# Patient Record
Sex: FEMALE | Race: WHITE | Employment: OTHER | URBAN - METROPOLITAN AREA
[De-identification: names, ages, dates, MRNs, and addresses within clinical notes are randomized per-mention and may not be internally consistent; named-entity substitution may affect disease eponyms.]

---

## 2017-01-16 ENCOUNTER — HOSPITAL ENCOUNTER (OUTPATIENT)
Dept: MAMMOGRAPHY | Facility: HOSPITAL | Age: 72
Discharge: HOME | End: 2017-01-16
Attending: PHYSICIAN ASSISTANT
Payer: MEDICARE

## 2017-01-16 DIAGNOSIS — Z12.31 ENCOUNTER FOR SCREENING MAMMOGRAM FOR MALIGNANT NEOPLASM OF BREAST: ICD-10-CM

## 2017-01-16 DIAGNOSIS — R93.89 ABNORMAL FINDINGS ON IMAGING TEST: ICD-10-CM

## 2017-01-16 PROCEDURE — 77066 DX MAMMO INCL CAD BI: CPT

## 2017-01-16 PROCEDURE — 76642 ULTRASOUND BREAST LIMITED: CPT

## 2017-01-16 PROCEDURE — 77062 BREAST TOMOSYNTHESIS BI: CPT

## 2018-08-28 ENCOUNTER — APPOINTMENT (OUTPATIENT)
Dept: OTHER | Facility: HOSPITAL | Age: 73
End: 2018-08-28
Attending: PREVENTIVE MEDICINE

## 2020-02-11 ENCOUNTER — HOSPITAL ENCOUNTER (OUTPATIENT)
Dept: CV DIAGNOSTICS | Facility: HOSPITAL | Age: 75
Discharge: HOME | End: 2020-02-11
Attending: PHYSICIAN ASSISTANT
Payer: MEDICARE

## 2020-02-11 ENCOUNTER — HOSPITAL ENCOUNTER (OUTPATIENT)
Dept: ULTRASOUND IMAGING | Facility: HOSPITAL | Age: 75
Discharge: HOME | End: 2020-02-11
Attending: PHYSICIAN ASSISTANT
Payer: MEDICARE

## 2020-02-11 DIAGNOSIS — I65.23 CAROTID STENOSIS, BILATERAL: ICD-10-CM

## 2020-02-11 DIAGNOSIS — R06.00 DYSPNEA ON EXERTION: ICD-10-CM

## 2020-02-11 DIAGNOSIS — E78.5 HYPERLIPIDEMIA, UNSPECIFIED HYPERLIPIDEMIA TYPE: ICD-10-CM

## 2020-02-11 DIAGNOSIS — I70.91 GENERALIZED ATHEROSCLEROSIS: ICD-10-CM

## 2020-02-11 PROCEDURE — 93880 EXTRACRANIAL BILAT STUDY: CPT | Performed by: PHYSICIAN ASSISTANT

## 2020-02-11 PROCEDURE — 93306 TTE W/DOPPLER COMPLETE: CPT | Performed by: PHYSICIAN ASSISTANT

## 2020-03-12 ENCOUNTER — HOSPITAL ENCOUNTER (OUTPATIENT)
Dept: CV DIAGNOSTICS | Facility: HOSPITAL | Age: 75
Discharge: HOME | End: 2020-03-12
Attending: PHYSICIAN ASSISTANT
Payer: MEDICARE

## 2020-03-12 DIAGNOSIS — I65.23 ATHEROSCLEROSIS OF BOTH CAROTID ARTERIES: ICD-10-CM

## 2020-03-12 DIAGNOSIS — I65.23 BILATERAL CAROTID ARTERY STENOSIS: ICD-10-CM

## 2020-03-12 DIAGNOSIS — E78.5 HYPERLIPEMIA: ICD-10-CM

## 2020-03-12 DIAGNOSIS — R06.00 DYSPNEA ON EXERTION: ICD-10-CM

## 2020-03-12 DIAGNOSIS — I70.91 ATHEROSCLEROSIS, GENERALIZED: ICD-10-CM

## 2020-03-12 PROCEDURE — 93350 STRESS TTE ONLY: CPT | Performed by: PHYSICIAN ASSISTANT

## 2020-03-12 PROCEDURE — 93017 CV STRESS TEST TRACING ONLY: CPT | Performed by: PHYSICIAN ASSISTANT

## 2020-03-12 PROCEDURE — 93018 CV STRESS TEST I&R ONLY: CPT | Performed by: PHYSICIAN ASSISTANT

## 2022-08-15 ENCOUNTER — OFFICE VISIT (OUTPATIENT)
Dept: HEMATOLOGY/ONCOLOGY | Facility: HOSPITAL | Age: 77
End: 2022-08-15
Attending: INTERNAL MEDICINE
Payer: MEDICARE

## 2022-08-15 VITALS
TEMPERATURE: 98 F | RESPIRATION RATE: 18 BRPM | HEART RATE: 2 BPM | OXYGEN SATURATION: 98 % | DIASTOLIC BLOOD PRESSURE: 71 MMHG | SYSTOLIC BLOOD PRESSURE: 128 MMHG | BODY MASS INDEX: 20 KG/M2 | WEIGHT: 110.63 LBS

## 2022-08-15 DIAGNOSIS — D80.1 HYPOGAMMAGLOBULINEMIA (HCC): Primary | ICD-10-CM

## 2022-08-15 LAB
BASOPHILS # BLD AUTO: 0.05 X10(3) UL (ref 0–0.2)
BASOPHILS NFR BLD AUTO: 1 %
EOSINOPHIL # BLD AUTO: 0.08 X10(3) UL (ref 0–0.7)
EOSINOPHIL NFR BLD AUTO: 1.5 %
ERYTHROCYTE [DISTWIDTH] IN BLOOD BY AUTOMATED COUNT: 13.4 %
HCT VFR BLD AUTO: 42 %
HGB BLD-MCNC: 14 G/DL
IGA SERPL-MCNC: 71.6 MG/DL (ref 70–312)
IGM SERPL-MCNC: 62.5 MG/DL (ref 43–279)
IMM GRANULOCYTES # BLD AUTO: 0.01 X10(3) UL (ref 0–1)
IMM GRANULOCYTES NFR BLD: 0.2 %
IMMUNOGLOBULIN PNL SER-MCNC: 582 MG/DL (ref 791–1643)
LDH SERPL L TO P-CCNC: 195 U/L
LYMPHOCYTES # BLD AUTO: 1.43 X10(3) UL (ref 1–4)
LYMPHOCYTES NFR BLD AUTO: 27.6 %
MCH RBC QN AUTO: 31 PG (ref 26–34)
MCHC RBC AUTO-ENTMCNC: 33.3 G/DL (ref 31–37)
MCV RBC AUTO: 92.9 FL
MONOCYTES # BLD AUTO: 0.33 X10(3) UL (ref 0.1–1)
MONOCYTES NFR BLD AUTO: 6.4 %
NEUTROPHILS # BLD AUTO: 3.28 X10 (3) UL (ref 1.5–7.7)
NEUTROPHILS # BLD AUTO: 3.28 X10(3) UL (ref 1.5–7.7)
NEUTROPHILS NFR BLD AUTO: 63.3 %
PLATELET # BLD AUTO: 192 10(3)UL (ref 150–450)
RBC # BLD AUTO: 4.52 X10(6)UL
WBC # BLD AUTO: 5.2 X10(3) UL (ref 4–11)

## 2022-08-15 PROCEDURE — 85025 COMPLETE CBC W/AUTO DIFF WBC: CPT | Performed by: INTERNAL MEDICINE

## 2022-08-15 PROCEDURE — 82784 ASSAY IGA/IGD/IGG/IGM EACH: CPT | Performed by: INTERNAL MEDICINE

## 2022-08-15 PROCEDURE — 36415 COLL VENOUS BLD VENIPUNCTURE: CPT

## 2022-08-15 PROCEDURE — 83615 LACTATE (LD) (LDH) ENZYME: CPT | Performed by: INTERNAL MEDICINE

## 2022-08-15 NOTE — PROGRESS NOTES
Patient is here for consultation for low protein levels. She was recommended by Dr. Franki Blanton office to come here. She reports that in the 1990's she had an episode of fever and chills after a skiing trip. She had a bone marrow aspiration for possible leukemia. 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 She had chemo and this ultimately resolved. She generally feels well. She says that she always feels tired and can fall asleep easily. She plays [employer] ball three times a week and also goes to the health club. She is eating well. She denies any fever or night sweats, no cough or shortness of breath.    She reports that she has osteopenia and takes vitamin D.     Education Record    Learner:  Patient    Disease / Diagnosis: low proteins    Barriers / Limitations:  None   Comments:    Method:  Discussion   Comments:    General Topics:  Side effects and symptom management   Comments:    Outcome:  Shows understanding   Comments:

## 2022-08-24 ENCOUNTER — TELEPHONE (OUTPATIENT)
Dept: HEMATOLOGY/ONCOLOGY | Facility: HOSPITAL | Age: 77
End: 2022-08-24

## 2023-03-15 PROBLEM — Z80.0 FAMILY HISTORY OF COLON CANCER: Status: ACTIVE | Noted: 2023-03-15

## 2023-03-15 PROBLEM — K63.5 COLON POLYP: Status: ACTIVE | Noted: 2023-03-15

## 2023-03-15 PROBLEM — Z86.0101 HISTORY OF ADENOMATOUS POLYP OF COLON: Status: ACTIVE | Noted: 2023-03-15

## 2025-01-02 ENCOUNTER — HOSPITAL ENCOUNTER (EMERGENCY)
Facility: HOSPITAL | Age: 80
Discharge: HOME | End: 2025-01-02
Attending: EMERGENCY MEDICINE
Payer: MEDICARE

## 2025-01-02 ENCOUNTER — APPOINTMENT (OUTPATIENT)
Dept: GENERAL RADIOLOGY | Facility: HOSPITAL | Age: 80
End: 2025-01-02
Attending: EMERGENCY MEDICINE
Payer: MEDICARE

## 2025-01-02 VITALS
TEMPERATURE: 97 F | HEART RATE: 66 BPM | HEIGHT: 63 IN | BODY MASS INDEX: 19.49 KG/M2 | DIASTOLIC BLOOD PRESSURE: 73 MMHG | RESPIRATION RATE: 16 BRPM | WEIGHT: 110 LBS | OXYGEN SATURATION: 100 % | SYSTOLIC BLOOD PRESSURE: 128 MMHG

## 2025-01-02 DIAGNOSIS — M54.14 THORACIC RADICULOPATHY: Primary | ICD-10-CM

## 2025-01-02 PROCEDURE — 99283 EMERGENCY DEPT VISIT LOW MDM: CPT

## 2025-01-02 PROCEDURE — 72072 X-RAY EXAM THORAC SPINE 3VWS: CPT | Performed by: EMERGENCY MEDICINE

## 2025-01-02 RX ORDER — METHYLPREDNISOLONE 4 MG/1
TABLET ORAL
Qty: 1 EACH | Refills: 0 | Status: SHIPPED | OUTPATIENT
Start: 2025-01-02

## 2025-01-02 RX ORDER — TRAMADOL HYDROCHLORIDE 50 MG/1
TABLET ORAL EVERY 6 HOURS PRN
Qty: 10 TABLET | Refills: 0 | Status: SHIPPED | OUTPATIENT
Start: 2025-01-02 | End: 2025-01-07

## 2025-01-02 RX ORDER — MULTIVITAMIN WITH IRON
50 TABLET ORAL DAILY
COMMUNITY

## 2025-01-02 RX ORDER — ORPHENADRINE CITRATE 100 MG/1
100 TABLET ORAL 2 TIMES DAILY
Qty: 14 EACH | Refills: 0 | Status: SHIPPED | OUTPATIENT
Start: 2025-01-02 | End: 2025-01-09

## 2025-01-02 NOTE — ED INITIAL ASSESSMENT (HPI)
Patient reports she has had shoulder blade pain on the left side for the last two nights. States she is unable to get comfortable. States when she supinates her hand, she feels tingling from her shoulder down to her fingertips. Full ROM in left arm. Was instructed by PCP to go to urgent care who did not see her but sent her to ED. Took advil at 0530, states she got relief from advil. Reports complete resolution of pain on arrival to ED but had burning pain that interfered with sleep last night.

## 2025-01-02 NOTE — ED PROVIDER NOTES
Patient Seen in: [facility] Emergency Department      History     Chief Complaint   Patient presents with    Shoulder Pain     Stated Complaint: L shoulder blade \"burning\" pain x2 days    Subjective:   HPI      79-year-old female presents for evaluation of a burning pain to her left shoulder blade area which radiates down the inside of her upper arm and forearm.  Pain seems to be worse with certain movements of the torso.  She has no weakness or tingling to the left arm.  No chest pain or shortness of breath.  There is no pleuritic component.  No cough or cold.  No chest wall rashes.  She does play pickle ball but uses mostly her right arm    Objective:     Past Medical History:    High cholesterol    History of blood transfusion    Leaking of urine    Osteoporosis    Wears glasses              Past Surgical History:   Procedure Laterality Date    Colonoscopy      Hysterectomy  1980's    Graham localization wire 1 site left (cpt=19281)  1991    benign    Graham localization wire 1 site right (cpt=19281)  1976    benign    Other surgical history      knee surgery    Tonsillectomy                  Social History     Socioeconomic History    Marital status:    Tobacco Use    Smoking status: Never    Smokeless tobacco: Never   Substance and Sexual Activity    Alcohol use: Yes     Alcohol/week: 4.0 standard drinks of alcohol     Types: 4 Glasses of wine per week     Comment: 2-3 times per week, 2 glasses of wine per day    Drug use: No     Social Drivers of Health      Received from [facility]    Social Connections    Received from [facility], [facility]    Housing Stability                  Physical Exam     ED Triage Vitals [01/02/25 1154]   /73   Pulse 66   Resp 16   Temp 97.3 °F (36.3 °C)   Temp src Temporal   SpO2 99 %   O2 Device None (Room air)       Current Vitals:   Vital Signs  BP: 128/73  Pulse: 66  Resp: 16  Temp: 97.3 °F (36.3  °C)  Temp src: Temporal    Oxygen Therapy  SpO2: 99 %  O2 Device: None (Room air)        Physical Exam     General: Alert, oriented, no apparent distress  HEENT:  Pupils equal reactive.  Extraocular motions intact. MMM.  Neck: Supple  Lungs: Clear to auscultation bilaterally.  Heart: Regular rate and rhythm.  Abdomen: Soft, nontender.   Skin: No rash.  No edema.  Neurologic: No focal neurologic deficits.  Normal speech pattern  Musculoskeletal: Tenderness to palpation along the left rhomboid musculature.  Full range of motion throughout.      ED Course   Labs Reviewed - No data to display                MDM      Differential diagnosis includes radiculopathy, shingles, pleurisy    XR THORACIC SPINE (3 VIEWS) (CPT=72072)    Result Date: 1/2/2025  PROCEDURE:  XR THORACIC SPINE (3 VIEWS) (CPT=72072)  TECHNIQUE:  AP, lateral, and swimmer's views of the thoracic spine were obtained.  COMPARISON:  None.  INDICATIONS:  L shoulder blade burning pain x2 days  PATIENT STATED HISTORY: (As transcribed by Technologist)    L shoulder blade burning pain x2 days.    FINDINGS:    BONES:  Minimal dextroscoliotic deformity is noted of the lower thoracic spine. DISC SPACES:  Mild disc space narrowing and endplate osteophyte formation is noted at all levels. PARASPINOUS:  Negative.  No paraspinous abnormality is seen. OTHER:  Negative.             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  There is no acute abnormality.   LOCATION:  [facility]    Dictated by (CST): Adriel Crockett MD on 1/02/2025 at 1:28 PM     Finalized by (CST): Adriel Crockett MD on 1/02/2025 at 1:29 PM        Suspect a upper thoracic radiculopathy.  Plan for Medrol and Norflex.  Ultram as needed for breakthrough pain.  Follow-up with pain/spine if not better in about 1 week.  Return here if symptoms worsen or if new symptoms develop    Medical Decision Making      Disposition and Plan     Clinical Impression:  1. Thoracic radiculopathy          Disposition:  Discharge  1/2/2025  1:59 pm    Follow-up:  [facility], [address] [place] [address] [place] [id number] [phone]  Call  choose option 2 for neurosurgery or pain follow up          Medications Prescribed:  Current Discharge Medication List        START taking these medications    Details   methylPREDNISolone (MEDROL) 4 MG Oral Tablet Therapy Pack Dosepack: take as directed  Qty: 1 each, Refills: 0      orphenadrine  MG Oral Tablet 12 Hr Take 100 mg by mouth 2 (two) times daily for 7 days.  Qty: 14 each, Refills: 0      traMADol 50 MG Oral Tab Take 1-2 tablets ( mg total) by mouth every 6 (six) hours as needed for Pain.  Qty: 10 tablet, Refills: 0    Associated Diagnoses: Thoracic radiculopathy                 Supplementary Documentation:

## 2025-01-02 NOTE — DISCHARGE INSTRUCTIONS
Neurologic Instructions    Call your doctor if you have:  increased pain or headache.   trouble seeing, walking or using your arms or legs.   dizziness or passing out.   any change in behavior (agitated or sleepy).   trouble being awakened from sleep.   numbness in your face, arm or leg.   extreme weakness.   trouble talking.   nausea and vomiting.   any new or severe symptoms.    Take your medicines as prescribed. Most important, see a doctor again as discussed. If you have problems that we have not discussed, call or visit your doctor right away. If you cannot reach your doctor, return to the Emergency Department.     START MEDROL FOR ANTI-INFLAMMATORY - DO NOT TAKE MOTRIN OR IBUPROFEN WITH THIS MEDICATION    TRY NORFLEX FOR MUSCLE RELAXANT - MAY CAUSE DIZZINESS SO RECOMMEND TRY AT NIGHT    TYLENOL 1 GRAM THREE TIMES DAILY FOR PAIN.  TRAMADOL IF NEEDED FOR BREAKTHROUGH PAIN

## 2025-01-31 ENCOUNTER — TELEPHONE (OUTPATIENT)
Facility: CLINIC | Age: 80
End: 2025-01-31

## 2025-01-31 DIAGNOSIS — M54.2 NECK PAIN: Primary | ICD-10-CM

## 2025-01-31 NOTE — TELEPHONE ENCOUNTER
X-Ray ordered and scheduled. Patient called and notified. She was instructed to drop off her MRI cd to our office before she goes over to imaging.      - Previous images were of the thoracic spine and pain is in the shoulder/neck area.  Weight bearing images ordered.     Future Appointments   Date Time Provider Department Center   2/3/2025  8:10 AM [facility] [place]   2/3/2025  8:40 AM Rigo Cornell MD [facility]     Patient has questions about if we take her insurance.  She states she will call her insurance to find out about her coverage.

## 2025-01-31 NOTE — TELEPHONE ENCOUNTER
Patient called to schedule for  There is degenerative disc disease in the thoracic spine.  Thoracic pain that goes down the arms.   Please advise if further imaging is needed.   Future Appointments   Date Time Provider Department Center   2/3/2025  8:40 AM Rigo Cornell MD [facility]     Patient will bring MRI disc from [facility].

## 2025-02-03 ENCOUNTER — TELEPHONE (OUTPATIENT)
Facility: CLINIC | Age: 80
End: 2025-02-03

## 2025-02-03 ENCOUNTER — HOSPITAL ENCOUNTER (OUTPATIENT)
Dept: GENERAL RADIOLOGY | Age: 80
Discharge: HOME | End: 2025-02-03
Attending: STUDENT IN AN ORGANIZED HEALTH CARE EDUCATION/TRAINING PROGRAM
Payer: MEDICARE

## 2025-02-03 ENCOUNTER — OFFICE VISIT (OUTPATIENT)
Dept: ORTHOPEDICS CLINIC | Facility: CLINIC | Age: 80
End: 2025-02-03
Payer: COMMERCIAL

## 2025-02-03 DIAGNOSIS — M54.2 NECK PAIN: ICD-10-CM

## 2025-02-03 DIAGNOSIS — M54.12 CERVICAL RADICULOPATHY: Primary | ICD-10-CM

## 2025-02-03 PROCEDURE — 99204 OFFICE O/P NEW MOD 45 MIN: CPT | Performed by: STUDENT IN AN ORGANIZED HEALTH CARE EDUCATION/TRAINING PROGRAM

## 2025-02-03 PROCEDURE — 1159F MED LIST DOCD IN RCRD: CPT | Performed by: STUDENT IN AN ORGANIZED HEALTH CARE EDUCATION/TRAINING PROGRAM

## 2025-02-03 PROCEDURE — 72050 X-RAY EXAM NECK SPINE 4/5VWS: CPT | Performed by: STUDENT IN AN ORGANIZED HEALTH CARE EDUCATION/TRAINING PROGRAM

## 2025-02-03 PROCEDURE — G2211 COMPLEX E/M VISIT ADD ON: HCPCS | Performed by: STUDENT IN AN ORGANIZED HEALTH CARE EDUCATION/TRAINING PROGRAM

## 2025-02-03 PROCEDURE — 1160F RVW MEDS BY RX/DR IN RCRD: CPT | Performed by: STUDENT IN AN ORGANIZED HEALTH CARE EDUCATION/TRAINING PROGRAM

## 2025-02-03 RX ORDER — MELOXICAM 7.5 MG/1
7.5 TABLET ORAL DAILY
Qty: 30 TABLET | Refills: 0 | Status: SHIPPED | OUTPATIENT
Start: 2025-02-03

## 2025-02-03 NOTE — TELEPHONE ENCOUNTER
Pt has questions about her diagnosis for her back and the physical therapy locations. Please advise the pt [phone]  No future appointments.

## 2025-02-03 NOTE — H&P
[facility] - ORTHOPEDICS  [address], [address] [address]  [address] [place] [address]  [phone]     NEW PATIENT VISIT - HISTORY AND PHYSICAL EXAMINATION     Name: Mackenzie Kennedy   MRN: [id number]  Date: 02/03/25       CC: neck and arm pain    REFERRED BY: Faustino Narvaez MD    HPI:   Mackenzie Kennedy is a very pleasant 80 year old female who presents today for evaluation of neck and arm pain. The distribution of symptoms are: 10% neck pain and 90% arm pain. The symptoms began 4 week(s) ago without any significant injury. Since the onset, the symptoms have remained the same. The patient reports  numbness and no weakness.  The symptom characteristics are as follows: 80-year-old female presenting with neck pain radiating down left upper extremity associated with numbness. Pain is dominantly around the interscapular..     Prior spine surgery: none.    Bowel and bladder symptoms: absent.    The patient has not had issues with balance and/or hand dexterity problems such as changes in penmanship or the use of buttons or zippers.    Treatment up to this time has included:    Evaluation: PCP and ED  NSAIDS: have not been prescribed  Narcotic use: None  Physical therapy: None  Spinal injections: None      PMH:   Past Medical History:    High cholesterol    History of blood transfusion    Leaking of urine    Osteoporosis    Wears glasses       PAST SURGICAL HX:  Past Surgical History:   Procedure Laterality Date    Colonoscopy      Hysterectomy  1980's    Graham localization wire 1 site left (cpt=19281)  1991    benign    Graham localization wire 1 site right (cpt=19281)  1976    benign    Other surgical history      knee surgery    Tonsillectomy         FAMILY HX:  Family History   Problem Relation Age of Onset    Heart Attack Father     Colon Cancer Mother     Breast Cancer Paternal Grandmother 60       ALLERGIES:  Cortizone-5 [hydrocortisone]    MEDICATIONS:   Current  Outpatient Medications   Medication Sig Dispense Refill    Meloxicam 7.5 MG Oral Tab Take 1 tablet (7.5 mg total) by mouth daily. 30 tablet 0    vitamin B-12 50 MCG Oral Tab Take 1 tablet (50 mcg total) by mouth daily.      methylPREDNISolone (MEDROL) 4 MG Oral Tablet Therapy Pack Dosepack: take as directed 1 each 0    PEG 3350-KCl-Na Bicarb-NaCl 420 g Oral Recon Soln Take as directed by physician 4000 mL 0    Rosuvastatin Calcium 5 MG Oral Tab TK 1 T PO QD  0    Omega-3 Fatty Acids (FISH OIL) 1200 MG Oral Cap Take by mouth.      vitamin E 400 UNITS Oral Cap Take 1,000 Units by mouth daily.      Vitamin D3 2000 units Oral Cap Take 1 capsule (2,000 Units total) by mouth daily.      Red Yeast Rice 600 MG Oral Cap Take by mouth.         ROS: A comprehensive 14 point review of systems was performed and was negative aside from the aforementioned per history of present illness.    SOCIAL HX:  Social History     Tobacco Use    Smoking status: Never    Smokeless tobacco: Never   Substance Use Topics    Alcohol use: Yes     Alcohol/week: 4.0 standard drinks of alcohol     Types: 4 Glasses of wine per week     Comment: 2-3 times per week, 2 glasses of wine per day         PE:   There were no vitals filed for this visit.  Estimated body mass index is 19.49 kg/m² as calculated from the following:    Height as of 1/2/25: 5' 3\" (1.6 m).    Weight as of 1/2/25: 110 lb (49.9 kg).    Physical Exam  Constitutional:       Appearance: Normal appearance.   HENT:      Head: Normocephalic and atraumatic.   Eyes:      Extraocular Movements: Extraocular movements intact.   Cardiovascular:      Pulses: Normal pulses. Skin warm and well perfused.  Pulmonary:      Effort: Pulmonary effort is normal. No respiratory distress.   Skin:     General: Skin is warm.   Psychiatric:         Mood and Affect: Mood normal.     Spine Exam:    Normal gait without difficulty  Able to heel, toe, tandem gait without difficulty  Level shoulders and hips in even  stance    Normal C-spine ROM    No tenderness to palpation of C-spine    Spluring: negative    Fletcher's: negative  IRR: negative  Sustained clonus: negative     and release: normal  Rhomberg: normal    UE Strength: 5/5 D Bi Tri WE FDS IO  UE Sensation: normal in C5-T1 distribution  UE reflexes: normal    Radiographic Examination/Diagnostics:  XR and MRI personally viewed, independently interpreted and radiology report was reviewed.  X-ray of the cervical spine demonstrates diffuse spondylotic changes  MRI of the thoracic spine demonstrates varying degrees of degenerative changes and stenosis    IMPRESSION: Mackenzie Kennedy is a 80 year old female with left upper extremity pain, particularly around the inner scapula, likely cervical radiculopathy    PLAN:     We reviewed the patients history, symptoms, exam findings, and imaging today.  We had a detailed discussion outlining the etiology, anatomy, pathophysiology, and natural history of cervical radiculopathy. The typical management of this condition may include lifestyle modification, NSAIDs, physical therapy, oral steroids, epidural injections, neuromodulatory medications, and sometimes pain medications.  Based on our discussion today we would like to have the patient initiate our recommendations for continued conservative therapy in the treatment of their condition noted in the assessment section.    - Referred patient to PT  - Prescribed Meloxicam    FOLLOW-UP:  We will see her back in follow-up in 6 weeks, or sooner if any problems arise. Patient understands and agrees with plan.       Rigo Cornell MD  Orthopedic Spine Surgeon  [facility]   [address] [place] [address] [id number] [place] [address] [id number] [place] [address].org  [email or web address]  t: [phone]   f: [phone]        This note was dictated using Dragon software.  While it was briefly proofread prior to completion, some grammatical, spelling, and  word choice errors due to dictation may still occur.

## 2025-02-04 ENCOUNTER — TELEPHONE (OUTPATIENT)
Dept: PHYSICAL THERAPY | Facility: HOSPITAL | Age: 80
End: 2025-02-04

## 2025-02-04 NOTE — TELEPHONE ENCOUNTER
Pt returned call and said her questions has been answered. She no longer needs to speak with anyone from the clinical staff.

## 2025-02-05 ENCOUNTER — OFFICE VISIT (OUTPATIENT)
Dept: PHYSICAL THERAPY | Facility: HOSPITAL | Age: 80
End: 2025-02-05
Attending: STUDENT IN AN ORGANIZED HEALTH CARE EDUCATION/TRAINING PROGRAM
Payer: MEDICARE

## 2025-02-05 DIAGNOSIS — M54.12 CERVICAL RADICULOPATHY: Primary | ICD-10-CM

## 2025-02-05 PROCEDURE — 97112 NEUROMUSCULAR REEDUCATION: CPT

## 2025-02-05 PROCEDURE — 97161 PT EVAL LOW COMPLEX 20 MIN: CPT

## 2025-02-05 NOTE — PROGRESS NOTES
SPINE EVALUATION:     Diagnosis:   Cervical radiculopathy (M54.12 Patient:  Mackenzie Kennedy (80 year old, female)        Referring Provider: Rigo Cornell  Today's Date   2/5/2025    Precautions:      Date of Evaluation: 02/05/25  Next MD visit: No data recorded  Date of Surgery: none     PATIENT SUMMARY   Summary of chief complaints: pain and tingling down her left arm and into her shoulder blade. Inability to play pickleball and lift weights as she usually does. Her sleep has been disturbed. She is able to cook but with tingling in the left arm.  History of current condition: tingling down the left arm to the fingers with itchy sensation that started at the New Year.  She was given a muscle relaxant and pain killer in the ER. She had an MRI 1/17/25 which revealed issues in her cervical spine. She saw a neurologist who diagnosed DDD.   Pain level: current 6 /10, at best 4 /10, at worst 7 /10  Description of symptoms: tingling, numbness, burning pain in the shoulder faye   Occupation: retired   Leisure activities/Hobbies: picklebal, balance work with light weights   Prior level of function: was using 8# weights for exercises  Current limitations: inability to use 8# weights, cook without pain, sleep w/o disturbances  Pt goals: get rid of the pain  Past medical history was reviewed with Mackenzie.  Significant findings include: R shoulder injury, high cholesterol,  Imaging/Tests: x-ray, MRI   Mackenzie  has a past medical history of High cholesterol, History of blood transfusion, Leaking of urine, Osteoporosis, and Wears glasses.  She  has a past surgical history that includes marin localization wire 1 site left (cpt=19281) (1991); marin localization wire 1 site right (cpt=19281) (1976); other surgical history; hysterectomy (1980's); tonsillectomy; and colonoscopy.    ASSESSMENT  Mackenzie presents to physical therapy evaluation with primary c/o pain and tingling down her left arm and into her shoulder blade. Inability to play  pickleball and lift weights as she usually does. Her sleep has been disturbed. She is able to cook but with tingling in the left arm.. The results of the objective tests and measures show consistent with diagnosis. Functional deficits include but are not limited to inability to use 8# weights, cook without pain, sleep w/o disturbances. Signs and symptoms are consistent with diagnosis of Cervical radiculopathy (M54.12. Pt and PT discussed evaluation findings, pathology, POC and HEP.  Pt voiced understanding and performs HEP correctly without reported pain. Skilled Physical Therapy is medically necessary to address the above impairments and reach functional goals.    OBJECTIVE:   Musculoskeletal:  Observation/Posture: forward head, sits erect, elevated left scap   Accessory Motion:     Palpation:       Special tests:   negative vertebral artery and alar ligament testing     SHIRLEY ROM WNL and Strength (5/5) except below:  (* denotes performed with pain)     Cervical ROM MMT (-/5)     Flex 42 5     Ext 70 5    R L R L     Side bend 26 12 5 5     Rotation 57 62 4 5       Neurological:  Sensation: Grossly intact to light touch SHIRLEY UE/LE except intact but tingling d/t radiculopathy   Deep Tendon Reflexes: WNL except     UMN: signs and symptoms WNL except     Peripheral Neurodynamic: WNL except       Balance and Functional Mobility:  Gait: pt ambulates on level ground with normal mechanics.      Today's Treatment and Response:   Pt education was provided on exam findings, treatment diagnosis, treatment plan, expectations, and prognosis.  Today's Treatment       2/5/2025   PT Treatment   Neuro Re-Ed Postural awareness training:  -sitting posture using core stabilizers and correcting posture through trunk, not just the neck; sleep posture  -STM to neck/posterior shoulder w/Tps in paraspinals and posterior shoulder girdle  -review of pt's current HEP for balance     Manual Therapy -cervical distraction   Neuro Re-Ed Min 25    Manual Therapy Min 7   Evaluation Min 13   Total of Timed Procedures 32   Total of Service Based 13   Total Treatment Time 45         Patient was instructed in and issued a HEP for: Access Code: [email or web address]  URL: [email or web address]  Date: 02/05/2025  Prepared by: Denae Villela    Exercises  - Correct Seated Posture  - 1 x daily - 7 x weekly - 3 sets - 10 reps    Charges:  PT EVAL: Low Complexity, 1 eval, 2 NM  In agreement with evaluation findings and clinical rationale, this evaluation involved LOW COMPLEXITY decision making due to no personal factors/comorbidities, 1-2 body structures involved/activity limitations, and stable symptoms as documented in the evaluation.                                                                         PLAN OF CARE:    Goals: (to be met in 16 visits)   Goals    None          Frequency / Duration: Patient will be seen 2x/week or a total of 16  visits over a 90 day period. Treatment will include: Neuromuscular Re-education; Manual Therapy; Therapeutic Exercise; Therapeutic Activities; Home Exercise Program instruction    Education or treatment limitation: None   Rehab Potential: good     QuickDASH Outcome Score  Score: (Patient-Rptd) 43.18 % (2/3/2025  7:02 PM)      Patient/Family/Caregiver was advised of these findings, precautions, and treatment options and has agreed to actively participate in planning and for this course of care.    Thank you for your referral. Please co-sign or sign and return this letter via fax as soon as possible to [phone]. If you have any questions, please contact me at Dept: [phone]    Sincerely,  Electronically signed by therapist: Denae Casey PT  Physician's certification required: Yes  I certify the need for these services furnished under this plan of treatment and while under my care.    X___________________________________________________ Date____________________    Certification From: 2/5/2025  To:5/6/2025

## 2025-02-12 ENCOUNTER — OFFICE VISIT (OUTPATIENT)
Dept: PHYSICAL THERAPY | Facility: HOSPITAL | Age: 80
End: 2025-02-12
Attending: STUDENT IN AN ORGANIZED HEALTH CARE EDUCATION/TRAINING PROGRAM
Payer: MEDICARE

## 2025-02-12 PROCEDURE — 97110 THERAPEUTIC EXERCISES: CPT

## 2025-02-12 NOTE — PROGRESS NOTES
Patient: Mackenzie Kennedy (80 year old, female) Referring Provider:  Insurance:   Diagnosis: Cervical radiculopathy (M54.12 Rigo Cornell  [employer]   Date of Surgery: none Next MD visit:  N/A   Precautions:    No data recorded Referral Information:    Date of Evaluation: Req: 12, Auth: 12, Exp: 3/24/2025    02/05/25 POC Auth Visits:          Today's Date   2/12/2025    Subjective  I was sore the day after therapy but I think that it is good.  Then it went away.  I am taking the pills every night. I want to reinforce the right posture with my exercises.       Pain: 2/10 (in the left shoulder blade and down the back of the left arm to the elbow)     Objective  60 deg L rotation, 47 deg R              Assessment  symptoms and pain consistent with radiculopathy in the C spine. Skilled PT interventions performed in order to address patient dysfunction, including exercising without pain.  Patient was educated and instructed on self MET and postural stabilization in neutral spine, keeping UT's relaxed to minimize compensations and maximize desired muscle activation. Interventions performed focused on improving patient's ability to exercise without pain.  HEP was reviewed and/or upgraded to maximize compliance and increase patient's ability to make functional gains at home.     Goals (to be met in 16 visits)   Goals    None         Plan  Centralize LUE radiculopathy, normalize C spine motion, restore function    Treatment Last 4 Visits       2/5/2025 2/12/2025   PT Treatment   Treatment Day  2   Therapeutic Exercise  MET for HEP to correct multi level R C spine rotations (end motion 55 deg R, 62 deg L)  -neutral sleep position for neck/LE alignment  -TB shoulder ER and rows  -review of pt's HEP for neutral spine posture   Neuro Re-Ed Postural awareness training:  -sitting posture using core stabilizers and correcting posture through trunk, not just the neck; sleep posture  -STM to neck/posterior shoulder w/Tps  in paraspinals and posterior shoulder girdle  -review of pt's current HEP for balance   For postural awareness: UT, levator, scalenes, suboccipital, pec major/minor, scap and posterior shoulder   Manual Therapy -cervical distraction MET: multi level R rotations of C spine corrections  Cervical traction   Therapeutic Exercise Min  30   Neuro Re-Ed Min 25 15   Manual Therapy Min 7    Evaluation Min 13    Total of Timed Procedures 32 45   Total of Service Based 13 0   Total Treatment Time 45 45         HEP  Access Code: [email or web address]  URL: [email or web address]  Date: 02/05/2025  Prepared by: Denae Villela    Exercises  - Correct Seated Posture  - 1 x daily - 7 x weekly - 3 sets - 10 reps    Charges     2 ther ex, 1 NM

## 2025-02-20 ENCOUNTER — OFFICE VISIT (OUTPATIENT)
Dept: PHYSICAL THERAPY | Facility: HOSPITAL | Age: 80
End: 2025-02-20
Attending: STUDENT IN AN ORGANIZED HEALTH CARE EDUCATION/TRAINING PROGRAM
Payer: MEDICARE

## 2025-02-20 PROCEDURE — 97110 THERAPEUTIC EXERCISES: CPT

## 2025-02-20 PROCEDURE — 97140 MANUAL THERAPY 1/> REGIONS: CPT

## 2025-02-20 NOTE — PROGRESS NOTES
Patient: Mackenzie Kennedy (80 year old, female) Referring Provider:  Insurance:   Diagnosis: Cervical radiculopathy (M54.12 Rigo Cornell  [employer]   Date of Surgery: none Next MD visit:  N/A   Precautions:    No data recorded Referral Information:    Date of Evaluation: Req: 12, Auth: 12, Exp: 3/24/2025    02/05/25 POC Auth Visits:  12       Today's Date   2/20/2025    Subjective  Pt. reports she is feeling good today. States 1/10 pain in neck and into R UE.  Pt. states neck and shoulders always feel tight. Has been under a lot of stress. Would really like to be able to play pickle ball again.       Pain: 1/10     Objective  Refer to flow sheet. Emphasis on improving cervical mobility and MT to decrease radicular symptoms. Added wall wipes with SB and wall push ups with SB to HEP. Supine on foam beam exercises this date to promote scapular retraction and posture awareness. Pt. education of improtance of movement, changing positions while reading/doing her artwork etc.                Assessment  Tolertaed session well. Good response to new exercises and MT. Able to abolish UE radicular symptoms this date.    Goals (to be met in 12 visits)   Goals    None         Plan  Assess response to last session. Continue with MT and scapular stabilization.    Treatment Last 4 Visits       2/5/2025 2/12/2025 2/20/2025   PT Treatment   Treatment Day  2 3   Therapeutic Exercise  MET for HEP to correct multi level R C spine rotations (end motion 55 deg R, 62 deg L)  -neutral sleep position for neck/LE alignment  -TB shoulder ER and rows  -review of pt's HEP for neutral spine posture Scifit 2'F2'B  Wall wipe c SB x 10  Wall push up c SB x 10  Supine on foam beam   *bench press x 10  *flys x 10  *OH flex x 10   Neuro Re-Ed Postural awareness training:  -sitting posture using core stabilizers and correcting posture through trunk, not just the neck; sleep posture  -STM to neck/posterior shoulder w/Tps in paraspinals and  posterior shoulder girdle  -review of pt's current HEP for balance   For postural awareness: UT, levator, scalenes, suboccipital, pec major/minor, scap and posterior shoulder    Manual Therapy -cervical distraction MET: multi level R rotations of C spine corrections  Cervical traction STM cervical paraspinals, UT, suboccipitals  MFR B UT  Cervical PA mobs gr. II x 10 bouts  Cervical distraction by PT 1 min x 5   Modalities   MHP x 5 min   Therapeutic Exercise Min  30 30   Neuro Re-Ed Min 25 15    Manual Therapy Min 7  15   Evaluation Min 13     Hot/Cold Pack Min   5   Total of Timed Procedures 32 45 45   Total of Service Based 13 0 5   Total Treatment Time 45 45 50         HEP  Wall wipes c SB  Wall push up with SB    Charges     TE 2(30) MT (15)

## 2025-02-24 ENCOUNTER — OFFICE VISIT (OUTPATIENT)
Dept: PHYSICAL THERAPY | Facility: HOSPITAL | Age: 80
End: 2025-02-24
Attending: STUDENT IN AN ORGANIZED HEALTH CARE EDUCATION/TRAINING PROGRAM
Payer: MEDICARE

## 2025-02-24 PROCEDURE — 97110 THERAPEUTIC EXERCISES: CPT

## 2025-02-24 PROCEDURE — 97140 MANUAL THERAPY 1/> REGIONS: CPT

## 2025-02-26 NOTE — PROGRESS NOTES
Patient: Mackenzie Kennedy (80 year old, female) Referring Provider:  Insurance:   Diagnosis: Cervical radiculopathy (M54.12 Rigo Cornell  [employer]   Date of Surgery: none Next MD visit:  N/A   Precautions:    No data recorded Referral Information:    Date of Evaluation: Req: 12, Auth: 12, Exp: 3/24/2025    02/05/25 POC Auth Visits:  12       Today's Date   2/24/2025    Subjective  Felt good after last session. Will be going out of town to go skiing in [place].       Pain: 1/10 (stiffness in neck)     Objective  Refer to flow sheer. Emphasis on scapular stabilization and MT to decrease muscle guarding. Added free motion this date to progress scapular strengthening.                Assessment  Tolerated session well. Good response to exercises and MT.Good mechanics noted throughout session.    Goals (to be met in 12 visits)   Goals       Therapy Goals     Patient to report compliance with finalized HEP to continue making functional progress upon DC.  Patient to demo understanding of initial HEP to start making functional gains by first PN.  Patient to report ability to exercise with 90 % improvement for return to balance/weight activities with ease by DC.  Patient to report ability to exercise with 50 % ease to progress toward return to balance/weight ex's with ease by first PN.  Patient to report 50 % improvement in pain for ease of ADLs by first PN.  Patient to report 90 % improvement in pain for return to prior exercise routine with ease by DC.  Patient to demo AROM cervical spine min 70 deg w/max 1/10 pain for ease of ADLs/exercise by DC.  Patient to report centralization of UE radiculopathy to eliminate neural irritation by DC               Plan  Assess response to last session. Continue with MT and scapular stabilization.    Treatment Last 4 Visits       2/5/2025 2/12/2025 2/20/2025 2/24/2025   PT Treatment   Treatment Day  2 3 4   Therapeutic Exercise  MET for HEP to correct multi level R C spine  rotations (end motion 55 deg R, 62 deg L)  -neutral sleep position for neck/LE alignment  -TB shoulder ER and rows  -review of pt's HEP for neutral spine posture Scifit 2'F2'B  Wall wipe c SB x 10  Wall push up c SB x 10  Supine on foam beam   *bench press x 10  *flys x 10  *OH flex x 10 Scifit 2'F2'B   Wall wipe c SB x 10   Wall push up c SB x 10   Free motion 10#  *B row x 15  *B Ext x 15  *B W squeeze x 15  Door stretch 10 sec x 5     Neuro Re-Ed Postural awareness training:  -sitting posture using core stabilizers and correcting posture through trunk, not just the neck; sleep posture  -STM to neck/posterior shoulder w/Tps in paraspinals and posterior shoulder girdle  -review of pt's current HEP for balance   For postural awareness: UT, levator, scalenes, suboccipital, pec major/minor, scap and posterior shoulder     Manual Therapy -cervical distraction MET: multi level R rotations of C spine corrections  Cervical traction STM cervical paraspinals, UT, suboccipitals  MFR B UT  Cervical PA mobs gr. II x 10 bouts  Cervical distraction by PT 1 min x 5 STM cervical paraspinals, UT, suboccipitals   MFR B UT   Cervical PA mobs gr. II x 10 bouts   Cervical distraction by PT 1 min x 5      Modalities   MHP x 5 min MHP x 5 min   Therapeutic Exercise Min  30 30 30   Neuro Re-Ed Min 25 15     Manual Therapy Min 7  15 15   Evaluation Min 13      Hot/Cold Pack Min   5 5   Total of Timed Procedures 32 45 45 45   Total of Service Based 13 0 5 5   Total Treatment Time 45 45 50 50         HEP  Wall wipes c SB  Wall push up with SB    Charges     TE 2, MT

## 2025-03-05 ENCOUNTER — OFFICE VISIT (OUTPATIENT)
Dept: PHYSICAL THERAPY | Facility: HOSPITAL | Age: 80
End: 2025-03-05
Attending: STUDENT IN AN ORGANIZED HEALTH CARE EDUCATION/TRAINING PROGRAM
Payer: MEDICARE

## 2025-03-05 PROCEDURE — 97164 PT RE-EVAL EST PLAN CARE: CPT

## 2025-03-05 PROCEDURE — 97110 THERAPEUTIC EXERCISES: CPT

## 2025-03-05 PROCEDURE — 97112 NEUROMUSCULAR REEDUCATION: CPT

## 2025-03-05 NOTE — PROGRESS NOTES
Patient: Mackenzie Kennedy (80 year old, female) Referring Provider:  Insurance:   Diagnosis: Cervical radiculopathy (M54.12 Rigo Cornell  [employer]   Date of Surgery: none Next MD visit:  N/A   Precautions:    No data recorded Referral Information:    Date of Evaluation: Req: 12, Auth: 12, Exp: 3/24/2025    02/05/25 POC Auth Visits:  12       Today's Date   3/5/2025    Subjective  I have been skiing.  I hardly used the left arm.  I did my exercises when I was on my trip, using the theraband.  I did the corrections too.  I didn't have as much trouble at night.  I have not had any tingling. I do get tightness on the right side, maybe due to holding tension. I feel 60% better due to having no tingling and increased range of motion in the neck. I would like to work on more range of motion in the neck and make sure I am doing the exercises correctly before I am done with PT.       Pain: 3/10     Objective  Flex 45 from 42, ext 70, rot 68 L from 62, 62 R from 57  Improved postural awareness and focus on relaxation, scap stabilization in sitting.       Assessment  Pt demo and reports improvements with PT. Patient reports 60 % improvement with skilled PT interventions this reporting period.  Functionally, the patient reports the ability to sleep and exercise with increased ease.  The patient reports difficulty with range of motion of C spine rotation.  Treatment has focused on improving ease of functional activities including exercise and ADLs.  Instruction and education have also been provided for HEP to maximize gains at home with patient reporting excellent compliance.  Plan of care is to continue PT in order to address remaining deficits of range of motion, increase scap stability, restore functional capacity and achieve stated goals.  Patient may be limited by general stiffness in the neck but is motivated to continue participating in physical therapy.     Goals (to be met in 12 visits)   Patient to report  compliance with finalized HEP to continue making functional progress upon DC.  Patient to demo understanding of initial HEP to start making functional gains by first PN.=met  Patient to report ability to exercise with 90 % improvement for return to balance/weight activities with ease by DC.  Patient to report ability to exercise with 50 % ease to progress toward return to balance/weight ex's with ease by first PN.=met  Patient to report 50 % improvement in pain for ease of ADLs by first PN.=met  Patient to report 90 % improvement in pain for return to prior exercise routine with ease by DC.  Patient to demo AROM cervical spine min 70 deg w/max 1/10 pain for ease of ADLs/exercise by DC.-improving  Patient to report centralization of UE radiculopathy to eliminate neural irritation by DC =met     Plan  Continue PT per POC to achieve remaining goals    Treatment Last 4 Visits       2/12/2025 2/20/2025 2/24/2025 3/5/2025   PT Treatment   Treatment Day 2 3 4 5   Therapeutic Exercise MET for HEP to correct multi level R C spine rotations (end motion 55 deg R, 62 deg L)  -neutral sleep position for neck/LE alignment  -TB shoulder ER and rows  -review of pt's HEP for neutral spine posture Scifit 2'F2'B  Wall wipe c SB x 10  Wall push up c SB x 10  Supine on foam beam   *bench press x 10  *flys x 10  *OH flex x 10 Scifit 2'F2'B   Wall wipe c SB x 10   Wall push up c SB x 10   Free motion 10#  *B row x 15  *B Ext x 15  *B W squeeze x 15  Door stretch 10 sec x 5      Neuro Re-Ed For postural awareness: UT, levator, scalenes, suboccipital, pec major/minor, scap and posterior shoulder      Manual Therapy MET: multi level R rotations of C spine corrections  Cervical traction STM cervical paraspinals, UT, suboccipitals  MFR B UT  Cervical PA mobs gr. II x 10 bouts  Cervical distraction by PT 1 min x 5 STM cervical paraspinals, UT, suboccipitals   MFR B UT   Cervical PA mobs gr. II x 10 bouts   Cervical distraction by PT 1 min x 5        Modalities  MHP x 5 min MHP x 5 min    Therapeutic Exercise Min 30 30 30    Neuro Re-Ed Min 15      Manual Therapy Min  15 15    Hot/Cold Pack Min  5 5    Total of Timed Procedures 45 45 45    Total of Service Based 0 5 5    Total Treatment Time 45 50 50           2/12/2025 2/20/2025 2/24/2025 3/5/2025   Spine Treatment   Treatment Day 2 3 4 5   Therapeutic Exercise    -review of pt's HEP for compliance  -yoga ex's for relaxation  -SL shoulder ER 1# 2x10  -SL shoulder abduc bent elbow 1#, 2x10  -supine punches 2# 2x10  -supine overhead CW/CCW 2# x 30 sec each   Neuro Re-Education    For postural awareness of neutral spine: B UT, levators, scalenes   Therapeutic Activity    -sleep positioning with pillow for legs and towel for shoulder support   Therapeutic Exercise Minutes    20   Neuro Re-Educ Minutes    10   Therapeutic Activity Minutes    4   Re-Eval Minutes    8   Total Time Of Timed Procedures    34   Total Time Of Service-Based Procedures    8   Total Treatment Time    42   HEP    Access Code: [email or web address]  URL: [email or web address]  Date: 03/05/2025  Prepared by: Denae Corderre    Exercises  - Supine Bilateral Punches  - 1 x daily - 4 x weekly - 3 sets - 10 reps  - Supine Shoulder Circles  - 1 x daily - 4 x weekly - 3 sets - 10 reps  - Sidelying Shoulder External Rotation  - 1 x daily - 4 x weekly - 3 sets - 10 reps        HEP  Access Code: [email or web address]  URL: [email or web address]  Date: 03/05/2025  Prepared by: Denae Corderre    Exercises  - Supine Bilateral Punches  - 1 x daily - 4 x weekly - 3 sets - 10 reps  - Supine Shoulder Circles  - 1 x daily - 4 x weekly - 3 sets - 10 reps  - Sidelying Shoulder External Rotation  - 1 x daily - 4 x weekly - 3 sets - 10 reps    Charges  1 RE, 1 TE, 1 NM

## 2025-03-10 ENCOUNTER — OFFICE VISIT (OUTPATIENT)
Dept: PHYSICAL THERAPY | Facility: HOSPITAL | Age: 80
End: 2025-03-10
Attending: STUDENT IN AN ORGANIZED HEALTH CARE EDUCATION/TRAINING PROGRAM
Payer: MEDICARE

## 2025-03-10 PROCEDURE — 97140 MANUAL THERAPY 1/> REGIONS: CPT

## 2025-03-10 PROCEDURE — 97112 NEUROMUSCULAR REEDUCATION: CPT

## 2025-03-10 PROCEDURE — 97110 THERAPEUTIC EXERCISES: CPT

## 2025-03-10 NOTE — PROGRESS NOTES
Patient: Mackenzie Kennedy (80 year old, female) Referring Provider:  Insurance:   Diagnosis: Cervical radiculopathy (M54.12 Rigo Cornell  [employer]   Date of Surgery: none Next MD visit:  N/A   Precautions:    No data recorded Referral Information:    Date of Evaluation: Req: 12, Auth: 12, Exp: 3/24/2025    02/05/25 POC Auth Visits:  12       Today's Date   3/10/2025    Subjective  The left arm is feeling good.  I am feeling tense today so I feel it in my left shoulder blade.  I feel a bit down the right arm but I worked out today.  I did a lot of balance work and light weights.       Pain: 2/10     Objective  R shoulder in protracted position with possible tight pecs        Assessment  Pt's posture could be contributing to R shoulder pain. Skilled PT interventions performed in order to address patient dysfunction, including exercising without pain.  Patient was educated and instructed on new therapeutic ex's to minimize compensations and maximize desired muscle activation. Interventions performed focused on improving patient's ability to workout without pain in the L or R UE/neck.  HEP was reviewed and/or upgraded to maximize compliance and increase patient's ability to make functional gains at home.     Goals (to be met in 12 visits)   Patient to report compliance with finalized HEP to continue making functional progress upon DC.  Patient to demo understanding of initial HEP to start making functional gains by first PN.=met  Patient to report ability to exercise with 90 % improvement for return to balance/weight activities with ease by DC.  Patient to report ability to exercise with 50 % ease to progress toward return to balance/weight ex's with ease by first PN.=met  Patient to report 50 % improvement in pain for ease of ADLs by first PN.=met  Patient to report 90 % improvement in pain for return to prior exercise routine with ease by DC.  Patient to demo AROM cervical spine min 70 deg w/max 1/10 pain  for ease of ADLs/exercise by DC.-improving  Patient to report centralization of UE radiculopathy to eliminate neural irritation by DC =met         Plan  Continue PT per POC to achieve remaining goals    Treatment Last 4 Visits       2/20/2025 2/24/2025 3/5/2025 3/10/2025   PT Treatment   Treatment Day 3 4 5 6   Therapeutic Exercise Scifit 2'F2'B  Wall wipe c SB x 10  Wall push up c SB x 10  Supine on foam beam   *bench press x 10  *flys x 10  *OH flex x 10 Scifit 2'F2'B   Wall wipe c SB x 10   Wall push up c SB x 10   Free motion 10#  *B row x 15  *B Ext x 15  *B W squeeze x 15  Door stretch 10 sec x 5       Manual Therapy STM cervical paraspinals, UT, suboccipitals  MFR B UT  Cervical PA mobs gr. II x 10 bouts  Cervical distraction by PT 1 min x 5 STM cervical paraspinals, UT, suboccipitals   MFR B UT   Cervical PA mobs gr. II x 10 bouts   Cervical distraction by PT 1 min x 5        Modalities MHP x 5 min MHP x 5 min     Therapeutic Exercise Min 30 30     Manual Therapy Min 15 15     Hot/Cold Pack Min 5 5     Total of Timed Procedures 45 45     Total of Service Based 5 5     Total Treatment Time 50 50            2/20/2025 2/24/2025 3/5/2025 3/10/2025   Spine Treatment   Treatment Day 3 4 5 6   Therapeutic Exercise   -review of pt's HEP for compliance  -yoga ex's for relaxation  -SL shoulder ER 1# 2x10  -SL shoulder abduc bent elbow 1#, 2x10  -supine punches 2# 2x10  -supine overhead CW/CCW 2# x 30 sec each -pec stretch  -prone 1# shoulder extension 2x10  -prone 1# shoulder abduc 2x 10-modified to no wt R d/t crepitus in shoulder blade, likely d/t weakness  -prone row 2# 2x10  -SL shoulder ER 1 # 2x10     Neuro Re-Education   For postural awareness of neutral spine: B UT, levators, scalenes -for postural awareness of neutral spine: R pec major, minor, subscap, scalenes, rhomboids   Manual Therapy    -first rib MET, scap mobs   Therapeutic Activity   -sleep positioning with pillow for legs and towel for shoulder  support    Therapeutic Exercise Minutes   20 18   Neuro Re-Educ Minutes   10 14   Manual Therapy Minutes    8   Therapeutic Activity Minutes   4    Re-Eval Minutes   8    Total Time Of Timed Procedures   34 40   Total Time Of Service-Based Procedures   8 0   Total Treatment Time   42 40   HEP   Access Code: [email or web address]  URL: [email or web address]  Date: 03/05/2025  Prepared by: Denae Corderre    Exercises  - Supine Bilateral Punches  - 1 x daily - 4 x weekly - 3 sets - 10 reps  - Supine Shoulder Circles  - 1 x daily - 4 x weekly - 3 sets - 10 reps  - Sidelying Shoulder External Rotation  - 1 x daily - 4 x weekly - 3 sets - 10 reps Access Code: [email or web address]  URL: [email or web address]  Date: 03/10/2025  Prepared by: Denae Corderre    Exercises  - Prone Scapular Retraction  - 1 x daily - 4 x weekly - 3 sets - 10 reps  - Prone Scapular Slide with Shoulder Extension  - 1 x daily - 4 x weekly - 3 sets - 10 reps  - Prone Scapular Retraction Arms at Side  - 1 x daily - 4 x weekly - 3 sets - 10 reps  - Prone Shoulder Row  - 1 x daily - 4 x weekly - 3 sets - 10 reps        HEP  Access Code: [email or web address]  URL: [email or web address]  Date: 03/10/2025  Prepared by: Denae Corderre    Exercises  - Prone Scapular Retraction  - 1 x daily - 4 x weekly - 3 sets - 10 reps  - Prone Scapular Slide with Shoulder Extension  - 1 x daily - 4 x weekly - 3 sets - 10 reps  - Prone Scapular Retraction Arms at Side  - 1 x daily - 4 x weekly - 3 sets - 10 reps  - Prone Shoulder Row  - 1 x daily - 4 x weekly - 3 sets - 10 reps    Charges  1 TE, 1 NM, 1 MT

## 2025-03-12 ENCOUNTER — OFFICE VISIT (OUTPATIENT)
Dept: PHYSICAL THERAPY | Facility: HOSPITAL | Age: 80
End: 2025-03-12
Attending: STUDENT IN AN ORGANIZED HEALTH CARE EDUCATION/TRAINING PROGRAM
Payer: MEDICARE

## 2025-03-12 PROCEDURE — 97140 MANUAL THERAPY 1/> REGIONS: CPT

## 2025-03-12 PROCEDURE — 97110 THERAPEUTIC EXERCISES: CPT

## 2025-03-12 NOTE — PROGRESS NOTES
Patient: Mackenzie Kennedy (80 year old, female) Referring Provider:  Insurance:   Diagnosis: Cervical radiculopathy (M54.12 Rigo Cornell  [employer]   Date of Surgery: none Next MD visit:  N/A   Precautions:   none No data recorded Referral Information:    Date of Evaluation: Req: 12, Auth: 12, Exp: 3/24/2025    02/05/25 POC Auth Visits:  12       Today's Date   3/12/2025    Subjective  Pt. states she is doing well. Getting back into her normal routine from when she was out of town skiing.       Pain: 0/10     Objective  Emphasis on scapular strengthening exercises. Added prone on SB with 1# weights. Pt. demonstrating good posture and body mechanics with all exercises.            Assessment  Tolerated session well. Good response to exercises and MT to decrease pec and UT tightness. Mild R shoulder pain, that was better at end of session.    Goals (to be met in 12 visits)   Patient to report compliance with finalized HEP to continue making functional progress upon DC.  Patient to demo understanding of initial HEP to start making functional gains by first PN.=met  Patient to report ability to exercise with 90 % improvement for return to balance/weight activities with ease by DC.  Patient to report ability to exercise with 50 % ease to progress toward return to balance/weight ex's with ease by first PN.=met  Patient to report 50 % improvement in pain for ease of ADLs by first PN.=met  Patient to report 90 % improvement in pain for return to prior exercise routine with ease by DC.  Patient to demo AROM cervical spine min 70 deg w/max 1/10 pain for ease of ADLs/exercise by DC.-improving  Patient to report centralization of UE radiculopathy to eliminate neural irritation by DC =met     Plan  Reassess next visit. Pt. is to try out pickle ball. Will discuss if she wants to add on 1-2 more visits or plan discharge.    Treatment Last 4 Visits       2/24/2025 3/5/2025 3/10/2025 3/12/2025   PT Treatment   Treatment  Day 4 5 6 7   Therapeutic Exercise Scifit 2'F2'B   Wall wipe c SB x 10   Wall push up c SB x 10   Free motion 10#  *B row x 15  *B Ext x 15  *B W squeeze x 15  Door stretch 10 sec x 5        Manual Therapy STM cervical paraspinals, UT, suboccipitals   MFR B UT   Cervical PA mobs gr. II x 10 bouts   Cervical distraction by PT 1 min x 5         Modalities MHP x 5 min      Therapeutic Exercise Min 30      Manual Therapy Min 15      Hot/Cold Pack Min 5      Total of Timed Procedures 45      Total of Service Based 5      Total Treatment Time 50             2/24/2025 3/5/2025 3/10/2025 3/12/2025   UE Treatment   Treatment Day 4 5 6 7   Therapeutic Exercise    Scifit 2'F2'B   Wall wipe c SB x 10   Wall push up with plus c SB x 10   Door stretch 10 sec x 5   Prone on SB c 1# weights  *Y,W,T,I x 10 each     Manual Therapy    STM cervical paraspinals, UT, suboccipitals   MFR B UT   Cervical PA mobs gr. II x 10 bouts   Cervical distraction by PT 1 min x 5      Modalities    MHP neck x 5 min   Therapeutic Exercise Minutes    30   Manual Therapy Minutes    15   Hot/Cold Pack Minutes    5   Total Time Of Timed Procedures    45   Total Time Of Service-Based Procedures    5   Total Treatment Time    50        HEP       Charges  TE 2 MT

## 2025-03-17 ENCOUNTER — OFFICE VISIT (OUTPATIENT)
Dept: PHYSICAL THERAPY | Facility: HOSPITAL | Age: 80
End: 2025-03-17
Attending: STUDENT IN AN ORGANIZED HEALTH CARE EDUCATION/TRAINING PROGRAM
Payer: MEDICARE

## 2025-03-17 PROCEDURE — 97530 THERAPEUTIC ACTIVITIES: CPT

## 2025-03-17 PROCEDURE — 97110 THERAPEUTIC EXERCISES: CPT

## 2025-03-17 PROCEDURE — 97112 NEUROMUSCULAR REEDUCATION: CPT

## 2025-03-17 NOTE — PROGRESS NOTES
Patient: Mackenzie Kennedy (80 year old, female) Referring Provider:  Insurance:   Diagnosis: Cervical radiculopathy (M54.12 Rigo Cornell  [employer]   Date of Surgery: none Next MD visit:  N/A   Precautions:    No data recorded Referral Information:    Date of Evaluation: Req: 12, Auth: 12, Exp: 3/24/2025    02/05/25 POC Auth Visits:  12      Discharge Summary  Pt has attended 8 visits in Physical Therapy.    Today's Date   3/17/2025    Subjective  The left shoulder/arm are 100% better. I know I need to keep with exercises and work on my posture. I had a pickleball lesson for 45 minutes and had no problem with the left side.  I had a little sensation in the right arm.  I am feeling ready to be done with PT and focus on my home program.       Pain: 0/10     Objective  R rotation 60, L rotation 67; multi level R rotations still present; post C rotation 74 L, 70 R  -alignment corrected post MET; TP improved in neck, sitting posture improved since eval        Assessment  Pt has made good progress with PT to date. Patient reports feeling 100 % improvement with this episode of skilled PT interventions.  Functionally, the patient reports increased ease of cervical motion and return to exercise, including trial of pickleball.  All goals have been achieved.  Patient reports feeling prepared to DC to HEP and should continue to sustain gains made during physical therapy with compliance with HEP and with ease into additional exercises. She knows she needs to continue focusing on posture and performing corrections as needed for neutral alignment. Pt is DC from this episode of care.     Goals (to be met in 12 visits) - Remaining goals achieved as of 3/17/25  Patient to report compliance with finalized HEP to continue making functional progress upon DC.  Patient to demo understanding of initial HEP to start making functional gains by first PN.=met  Patient to report ability to exercise with 90 % improvement for return  to balance/weight activities with ease by DC.  Patient to report ability to exercise with 50 % ease to progress toward return to balance/weight ex's with ease by first PN.=met  Patient to report 50 % improvement in pain for ease of ADLs by first PN.=met  Patient to report 90 % improvement in pain for return to prior exercise routine with ease by DC.  Patient to demo AROM cervical spine min 70 deg w/max 1/10 pain for ease of ADLs/exercise by DC.-improving  Patient to report centralization of UE radiculopathy to eliminate neural irritation by DC =met             Plan  DC from PT    Treatment Last 4 Visits       3/5/2025 3/10/2025 3/12/2025 3/17/2025   PT Treatment   Treatment Day 5 6 7 8          3/5/2025 3/10/2025 3/12/2025 3/17/2025   Spine Treatment   Treatment Day 5 6 7 8   Therapeutic Exercise -review of pt's HEP for compliance  -yoga ex's for relaxation  -SL shoulder ER 1# 2x10  -SL shoulder abduc bent elbow 1#, 2x10  -supine punches 2# 2x10  -supine overhead CW/CCW 2# x 30 sec each -pec stretch  -prone 1# shoulder extension 2x10  -prone 1# shoulder abduc 2x 10-modified to no wt R d/t crepitus in shoulder blade, likely d/t weakness  -prone row 2# 2x10  -SL shoulder ER 1 # 2x10    -MET to correct R rotations of C spine  -review of HEP: supine punches, overhead circles, prone scap strengthening     Neuro Re-Education For postural awareness of neutral spine: B UT, levators, scalenes -for postural awareness of neutral spine: R pec major, minor, subscap, scalenes, rhomboids  -for postural awareness and kinesthetic sense of neutral spine, focusing on R side today and suboccipital release   Manual Therapy  -first rib MET, scap mobs  -cervical distraction   Therapeutic Activity -sleep positioning with pillow for legs and towel for shoulder support   -sleep posture review for neutral spine and prevention of excessive neck flexion using pillows for support   Therapeutic Exercise Minutes 20 18  20   Neuro Re-Educ Minutes  10 14  12   Manual Therapy Minutes  8  4   Therapeutic Activity Minutes 4   8   Re-Eval Minutes 8      Total Time Of Timed Procedures 34 40  44   Total Time Of Service-Based Procedures 8 0  0   Total Treatment Time 42 40  44   HEP Access Code: [email or web address]  URL: [email or web address]  Date: 03/05/2025  Prepared by: Denae Corderre    Exercises  - Supine Bilateral Punches  - 1 x daily - 4 x weekly - 3 sets - 10 reps  - Supine Shoulder Circles  - 1 x daily - 4 x weekly - 3 sets - 10 reps  - Sidelying Shoulder External Rotation  - 1 x daily - 4 x weekly - 3 sets - 10 reps Access Code: [email or web address]  URL: [email or web address]  Date: 03/10/2025  Prepared by: Denae Corderre    Exercises  - Prone Scapular Retraction  - 1 x daily - 4 x weekly - 3 sets - 10 reps  - Prone Scapular Slide with Shoulder Extension  - 1 x daily - 4 x weekly - 3 sets - 10 reps  - Prone Scapular Retraction Arms at Side  - 1 x daily - 4 x weekly - 3 sets - 10 reps  - Prone Shoulder Row  - 1 x daily - 4 x weekly - 3 sets - 10 reps          HEP  Access Code: [email or web address]  URL: [email or web address]/  Date: 03/10/2025  Prepared by: Denae Corderre    Exercises  - Prone Scapular Retraction  - 1 x daily - 4 x weekly - 3 sets - 10 reps  - Prone Scapular Slide with Shoulder Extension  - 1 x daily - 4 x weekly - 3 sets - 10 reps  - Prone Scapular Retraction Arms at Side  - 1 x daily - 4 x weekly - 3 sets - 10 reps  - Prone Shoulder Row  - 1 x daily - 4 x weekly - 3 sets - 10 reps    Charges  1 TE, 1 NM, 1 TA